# Patient Record
Sex: MALE | Race: WHITE
[De-identification: names, ages, dates, MRNs, and addresses within clinical notes are randomized per-mention and may not be internally consistent; named-entity substitution may affect disease eponyms.]

---

## 2021-08-11 ENCOUNTER — HOSPITAL ENCOUNTER (INPATIENT)
Dept: HOSPITAL 53 - M OR | Age: 56
LOS: 1 days | Discharge: HOME | DRG: 484 | End: 2021-08-12
Attending: UROLOGY | Admitting: UROLOGY
Payer: COMMERCIAL

## 2021-08-11 VITALS — WEIGHT: 239 LBS | BODY MASS INDEX: 37.51 KG/M2 | HEIGHT: 67 IN

## 2021-08-11 VITALS — DIASTOLIC BLOOD PRESSURE: 92 MMHG | SYSTOLIC BLOOD PRESSURE: 147 MMHG

## 2021-08-11 VITALS — SYSTOLIC BLOOD PRESSURE: 144 MMHG | DIASTOLIC BLOOD PRESSURE: 100 MMHG

## 2021-08-11 VITALS — DIASTOLIC BLOOD PRESSURE: 88 MMHG | SYSTOLIC BLOOD PRESSURE: 146 MMHG

## 2021-08-11 VITALS — DIASTOLIC BLOOD PRESSURE: 90 MMHG | SYSTOLIC BLOOD PRESSURE: 144 MMHG

## 2021-08-11 VITALS — DIASTOLIC BLOOD PRESSURE: 89 MMHG | SYSTOLIC BLOOD PRESSURE: 141 MMHG

## 2021-08-11 DIAGNOSIS — C61: Primary | ICD-10-CM

## 2021-08-11 LAB
BUN SERPL-MCNC: 15 MG/DL (ref 7–18)
CALCIUM SERPL-MCNC: 8.3 MG/DL (ref 8.5–10.1)
CHLORIDE SERPL-SCNC: 106 MEQ/L (ref 98–107)
CO2 SERPL-SCNC: 23 MEQ/L (ref 21–32)
CREAT SERPL-MCNC: 1.26 MG/DL (ref 0.7–1.3)
GFR SERPL CREATININE-BSD FRML MDRD: > 60 ML/MIN/{1.73_M2} (ref 56–?)
GLUCOSE SERPL-MCNC: 163 MG/DL (ref 70–100)
HCT VFR BLD AUTO: 45.3 % (ref 42–52)
HGB BLD-MCNC: 15 G/DL (ref 13.5–17.5)
MCH RBC QN AUTO: 31.4 PG (ref 27–33)
MCHC RBC AUTO-ENTMCNC: 33.1 G/DL (ref 32–36.5)
MCV RBC AUTO: 94.8 FL (ref 80–96)
PLATELET # BLD AUTO: 273 10^3/UL (ref 150–450)
POTASSIUM SERPL-SCNC: 3.7 MEQ/L (ref 3.5–5.1)
RBC # BLD AUTO: 4.78 10^6/UL (ref 4.3–6.1)
SODIUM SERPL-SCNC: 139 MEQ/L (ref 136–145)
WBC # BLD AUTO: 16.2 10^3/UL (ref 4–10)

## 2021-08-11 PROCEDURE — 07BC4ZZ EXCISION OF PELVIS LYMPHATIC, PERCUTANEOUS ENDOSCOPIC APPROACH: ICD-10-PCS | Performed by: UROLOGY

## 2021-08-11 PROCEDURE — 0VT04ZZ RESECTION OF PROSTATE, PERCUTANEOUS ENDOSCOPIC APPROACH: ICD-10-PCS | Performed by: UROLOGY

## 2021-08-11 RX ADMIN — SODIUM CHLORIDE SCH UNITS: 4.5 INJECTION, SOLUTION INTRAVENOUS at 22:00

## 2021-08-11 RX ADMIN — IRBESARTAN SCH MG: 150 TABLET ORAL at 09:00

## 2021-08-11 RX ADMIN — CEFAZOLIN SODIUM SCH MLS/HR: 1 INJECTION, POWDER, FOR SOLUTION INTRAMUSCULAR; INTRAVENOUS at 21:10

## 2021-08-11 RX ADMIN — DOCUSATE SODIUM SCH MG: 100 CAPSULE, LIQUID FILLED ORAL at 09:00

## 2021-08-11 RX ADMIN — DOCUSATE SODIUM SCH MG: 100 CAPSULE, LIQUID FILLED ORAL at 21:00

## 2021-08-11 NOTE — ROOPDOC
[facility] Report Of Operation


Report of Operation


DATE OF PROCEDURE: 8/11/21





PREPROCEDURE DIAGNOSIS:   Prostate cancer.


 


POSTPROCEDURE DIAGNOSIS:    Prostate cancer.


 


PROCEDURE:  Robotic-assisted laparoscopic radical prostatectomy with bilateral 

pelvic lymph node dissection.


 


SURGEON:  Nohemi Cerda MD


 


ASSISTANT:  Kaylie Trevino NP


 


ANESTHESIA:  General.


 


OPERATIVE INDICATIONS: This is a 56 year old male with clinical T1c Belinda 9 

prostate cancer. After a discussion of the options for treatment, he elected to 

undergo the above procedure.


  


DESCRIPTION OF PROCEDURE: The patient was brought to the operating room and 

general anesthesia was induced. Prophylactic antibiotics were infused. He was 

then placed in the dorsal lithotomy position and prepped and draped in the usual

sterile fashion. At this point, a Bhardwaj catheter was inserted into the bladder 

and the balloon was filled with 10 mL of sterile water.  We then made a midline 

incision just above the umbilicus for an 8 mm port.  A Veress needle was 

utilized to achieve pneumoperitoneum.  Next, an 8 mm port was inserted into the 

incision and subsequently a camera was inserted.  There were no injuries from 

the Veress needle or initial trocar placement.


 


At this point, we placed the remaining ports, including a 12 mm assistant port 

and then three robotic ports in the usual configuration.  Once all the ports 

were placed, the robot was docked.   Lysis of adhesions between the sigmoid 

colon and abdominal wall was then performed.  The bladder was then released from

the anterior abdominal wall using electrocautery.  Once the bladder was dropped,

the fat overlying the prostate was cleared using electrocautery.  The 

superficial dorsal vein was controlled with electrocautery.  The endopelvic 

fascia was opened on both sides and the dorsal venous complex was cleared.  

Next, a #0 Vicryl figure-of-eight stitch was placed around the dorsal venous 

complex.  Once that was done, the bladder was opened and dissected away from the

prostate.  At this point, the prostate was lifted up.  The vasa deferentia were 

identified in the midline.  They were then ligated and transected.  The seminal 

vesicles were also dissected off bilaterally.  The rectum was safely mobilized 

away from the prostate.  Bilateral prostatic pedicles were taken using the 

Harmonic scalpel.  The pedicles were carried towards the apex.  After taking 

care of the pedicles and mobilizing the rectum off the prostate below, the 

prostate was only connected by the urethra.  At this point, the dorsal venous 

complex was transected with electrocautery.  The urethra was then opened and the

catheter was withdrawn and the posterior urethra was transected, thus freeing 

the prostate.  At this point, we checked for hemostasis and it did appear very 

good.


 


Next, we performed bilateral pelvic lymph node dissection.  This was done in a 

standard fashion.  The limits of dissection were the iliac vein proximally, the 

obturator nerve distally, the pelvic sidewall laterally, and the bladder media

lly. All lymphatic tissue within these limits was removed.  I performed the same

procedure on both the right and left sides. Hemostasis was then obtained with 

bipolar electrocautery. The lymphatic packets were then placed in separate Endo 

Catch bags for future retrieval.


 


Once hemostasis was confirmed, I then moved on to perform the vesicourethral 

anastomosis. This was performed with a Quill stitch in a running fashion. Once 

this was done, the final #20-Croat[place] Bhardwaj catheter was placed. The balloon was 

filled with 15 mL of sterile water. Upon completion of the vesicourethral 

anastomosis, it was tested by filling the bladder with sterile saline water. The

anastomosis appeared to be watertight. At this point, the prostate and seminal 

vesicles were placed in an Endo Catch bag for future retrieval. Next, a Shaheen-

Rodriguez drain was brought in through the left robotic port skin site and the drain

was positioned anterior to the bladder.  The robot was then undocked.


 


A Michael-Binh fascial closure device was utilized to place a #0 Vicryl 

suture between the fascia of the 12 mm assistant port. The drain was secured to 

the skin with #2-0 Ethilon suture.  The prostate, as well as the lymphatic 

packets were then extracted from the camera port site after the skin was e

xtended.  The fascia in this incision was then closed with a running #0 Vicryl 

stitch.  I then looked back in the abdomen and no intraabdominal contents were 

caught in the extraction site fascial closure. Next, all the remaining ports 

were removed and there did not appear to be any bleeding from any of the port 

sites. The previously placed #0 Vicryl free ties through the assistant port were

then tied down and all incisions were irrigated. All of the incisions were then 

closed with running subcuticular #4-0 Monocryl sutures.  Local anesthesia was 

applied.  Dermabond was then applied to the incisions.  This marked the 

conclusion of the procedure.  The patient was then taken out of the dorsal 

lithotomy position, awakened from anesthesia and transported to the recovery 

room in stable condition.


 


ESTIMATED BLOOD LOSS:  250 mL.


 


COMPLICATIONS: None.


 


SPECIMENS:  Prostate and seminal vesicles, right pelvic lymph nodes, left pelvic

lymph nodes.


 


PLAN: The patient will be admitted to the hospital postoperatively, and he will 

likely be discharged home within the next 1-2 days.











NOHEMI CERDA MD           Aug 11, 2021 12:22

## 2021-08-12 VITALS — SYSTOLIC BLOOD PRESSURE: 157 MMHG | DIASTOLIC BLOOD PRESSURE: 99 MMHG

## 2021-08-12 VITALS — SYSTOLIC BLOOD PRESSURE: 157 MMHG | DIASTOLIC BLOOD PRESSURE: 96 MMHG

## 2021-08-12 VITALS — DIASTOLIC BLOOD PRESSURE: 99 MMHG | SYSTOLIC BLOOD PRESSURE: 157 MMHG

## 2021-08-12 LAB
BUN SERPL-MCNC: 16 MG/DL (ref 7–18)
CALCIUM SERPL-MCNC: 8.5 MG/DL (ref 8.5–10.1)
CHLORIDE SERPL-SCNC: 106 MEQ/L (ref 98–107)
CO2 SERPL-SCNC: 27 MEQ/L (ref 21–32)
CREAT SERPL-MCNC: 1 MG/DL (ref 0.7–1.3)
GFR SERPL CREATININE-BSD FRML MDRD: > 60 ML/MIN/{1.73_M2} (ref 56–?)
GLUCOSE SERPL-MCNC: 131 MG/DL (ref 70–100)
HCT VFR BLD AUTO: 42.7 % (ref 42–52)
HGB BLD-MCNC: 14.4 G/DL (ref 13.5–17.5)
MCH RBC QN AUTO: 31.8 PG (ref 27–33)
MCHC RBC AUTO-ENTMCNC: 33.7 G/DL (ref 32–36.5)
MCV RBC AUTO: 94.3 FL (ref 80–96)
PLATELET # BLD AUTO: 259 10^3/UL (ref 150–450)
POTASSIUM SERPL-SCNC: 4.3 MEQ/L (ref 3.5–5.1)
RBC # BLD AUTO: 4.53 10^6/UL (ref 4.3–6.1)
SODIUM SERPL-SCNC: 137 MEQ/L (ref 136–145)
WBC # BLD AUTO: 16.3 10^3/UL (ref 4–10)

## 2021-08-12 RX ADMIN — DOCUSATE SODIUM SCH MG: 100 CAPSULE, LIQUID FILLED ORAL at 08:52

## 2021-08-12 RX ADMIN — CEFAZOLIN SODIUM SCH MLS/HR: 1 INJECTION, POWDER, FOR SOLUTION INTRAMUSCULAR; INTRAVENOUS at 04:09

## 2021-08-12 RX ADMIN — SODIUM CHLORIDE SCH UNITS: 4.5 INJECTION, SOLUTION INTRAVENOUS at 06:09

## 2021-08-12 RX ADMIN — IRBESARTAN SCH MG: 150 TABLET ORAL at 10:30

## 2021-08-12 NOTE — IPNPDOC
Subjective


Review oF Systems


Chief Complaint


The patient is a 56-year-old male admitted with a reason for visit of Prostate 

Cancer.


Events since Last Encounter


No acute events o/n.  Noting a moderate amount of lower pelvic pressure this 

morning.  Patient also had the onset of moderate L calf pain that radiated up 

his leg.  Has not ambulated yet.  Denies n/v.  No f/c/ns.





Objective


Physical Examination


General Exam:  Alert, No Acute Distress


ABDOMEN EXAM:  Soft, Tenderness (minimal), Other (incicions clean/dry/intact; LAUREN

w/ serosanguinous output)


Extremity Exam:  Other (no calf tenderness); 


   No: Edema, Swelling


Skin Exam:  Nl turgor and temperature


Neuro Exam:  Normal Speech


Psych Exam:  Mental status NL


Other physical findings


catheter draining yellow urine





Vital Signs/I&O





Vital Signs








  Date Time  Temp Pulse Resp B/P (MAP) Pulse Ox O2 Delivery O2 Flow Rate FiO2


 


8/12/21 06:13 98.3 108 18 157/99 (118) 98 Nasal Cannula 3.0 














I&O- Last 24 Hours up to 6 AM 


 


 8/12/21





 06:00


 


Intake Total 3435 ml


 


Output Total 2160 ml


 


Balance 1275 ml











Laboratory Data


Labs 24H


Laboratory Tests 2


8/11/21 17:48: 


Nucleated Red Blood Cells % (auto) 0.0, Anion Gap 10, Glomerular Filtration Rate

> 60.0, Calcium Level 8.3L


8/12/21 06:22: 


Nucleated Red Blood Cells % (auto) 0.0, Anion Gap 4L, Glomerular Filtration Rate

> 60.0, Calcium Level 8.5


CBC/BMP


Laboratory Tests


8/11/21 17:48








8/12/21 06:22











Assessment/Plan


Date Seen


The patient was seen on 8/12/21.





Patient Summary


This is a 55 y/o M POD1 s/p RALP w/ BPLND.  His labs are w/i normal postop 

limits.  Good UOP.  Normal LAUREN output.  I suspect his LLE pain was from cramping 

from being in bed since yesterday morning.  His pain was gone this morning.  His

exam was unremarkable.





Plan/VTE


VTE Prophylaxis Ordered?:  Yes


VTE Exclusion Mechanical Proph:  N/A:VTE Prophy Ordered


VTE Exclusion Pharmacological:  N/A:VTE Prophy Ordered





Plan/Urinary Catheter


Urinary Catheter:  Other Catheter: (catheter needs to stay in for at least 7 

days for healing of the vesicourethral anastomosis)





Plan


- d/c IVF


- percocet prn pain


- oxybutynin prn bladder spasms


- wean O2


- continue home meds


- strict I/Os


- SCDs in bed


- SQH


- incentive spirometry


- ambulate


- clears - ADAT


- possible discharge home later today w/ catheter











NOHEMI CERDA MD           Aug 12, 2021 07:35

## 2021-08-12 NOTE — DSES
DISCHARGE SUMMARY



DATE OF ADMISSION:  08/11/2021

DATE OF DISCHARGE:  08/12/2021



ADMISSION DIAGNOSIS: Prostate cancer.



DISCHARGE DIAGNOSIS: Prostate cancer.



ADMITTING PHYSICIAN: Dr. Azam Berger.



DISCHARGING PHYSICIAN: Dr. Azam Berger.



PROCEDURES PERFORMED: Robotic-assisted laparoscopic radical prostatectomy with

bilateral pelvic lymph node dissection on 08/11/2021.



HISTORY OF PRESENT ILLNESS: This is a 56-year-old male with high-risk prostate

cancer who underwent the above-listed procedure on 08/11/2021. He was admitted

to the hospital postoperatively. 



HOSPITALIZATION COURSE: The patient was admitted to the hospital after

undergoing surgery on 08/11/2021. Immediately postoperatively while on the

regular nursing floor he was noted to have increased lower abdominal discomfort

as well as moderate to severe left lower extremity pain. The pain in his lower

extremity appeared to be due to cramps. By postoperative day #1 the pain had

resolved, and the lower abdominal pain had improved. All of his labs during his

hospital stay were within acceptable limits. Specifically, his hemoglobin level

remained stable at 14.4 on postoperative day #1. His serum creatinine was 1 on

postoperative day #1. He had very good urine output throughout his hospital

stay and normal output from his Shaheen-Rodriguez drain. On postoperative day #1 we

got him ambulating, and he ambulated without any difficulty. His diet was

advanced, and he tolerated a regular diet. His pain was controlled with oral

pain medication. By the afternoon of postoperative day #1 he was deemed ready

for discharge home. We removed his Shaheen-Rodriguez drain. He was discharged home

with his catheter in place with the plan for him to followup in the urology

clinic in approximately 1 week for catheter removal and pathology results. 

JEOVANY